# Patient Record
Sex: FEMALE | Race: WHITE | NOT HISPANIC OR LATINO | URBAN - METROPOLITAN AREA
[De-identification: names, ages, dates, MRNs, and addresses within clinical notes are randomized per-mention and may not be internally consistent; named-entity substitution may affect disease eponyms.]

---

## 2017-01-13 ENCOUNTER — IMPORTED ENCOUNTER (OUTPATIENT)
Dept: URBAN - METROPOLITAN AREA CLINIC 43 | Facility: CLINIC | Age: 82
End: 2017-01-13

## 2017-01-13 PROBLEM — H01.002: Noted: 2017-01-13

## 2017-01-13 PROBLEM — H01.005: Noted: 2017-01-13

## 2017-01-13 PROBLEM — H16.223: Noted: 2017-01-13

## 2017-01-13 PROBLEM — H35.363: Noted: 2017-01-13

## 2017-01-13 PROBLEM — H40.013: Noted: 2017-01-13

## 2017-01-30 ENCOUNTER — IMPORTED ENCOUNTER (OUTPATIENT)
Dept: URBAN - METROPOLITAN AREA CLINIC 43 | Facility: CLINIC | Age: 82
End: 2017-01-30

## 2017-08-30 ENCOUNTER — IMPORTED ENCOUNTER (OUTPATIENT)
Dept: URBAN - METROPOLITAN AREA CLINIC 43 | Facility: CLINIC | Age: 82
End: 2017-08-30

## 2017-08-30 PROBLEM — H40.013: Noted: 2017-08-30

## 2017-08-30 PROBLEM — H01.025: Noted: 2017-08-30

## 2017-08-30 PROBLEM — H16.223: Noted: 2017-08-30

## 2017-08-30 PROBLEM — H01.022: Noted: 2017-08-30

## 2017-08-30 PROBLEM — H35.363: Noted: 2017-08-30

## 2017-10-04 ENCOUNTER — IMPORTED ENCOUNTER (OUTPATIENT)
Dept: URBAN - METROPOLITAN AREA CLINIC 43 | Facility: CLINIC | Age: 82
End: 2017-10-04

## 2018-02-14 ENCOUNTER — IMPORTED ENCOUNTER (OUTPATIENT)
Dept: URBAN - METROPOLITAN AREA CLINIC 43 | Facility: CLINIC | Age: 83
End: 2018-02-14

## 2018-02-14 PROBLEM — H16.223: Noted: 2018-02-14

## 2018-02-14 PROBLEM — H40.013: Noted: 2018-02-14

## 2018-08-21 ENCOUNTER — IMPORTED ENCOUNTER (OUTPATIENT)
Dept: URBAN - METROPOLITAN AREA CLINIC 43 | Facility: CLINIC | Age: 83
End: 2018-08-21

## 2018-08-21 PROBLEM — H40.013: Noted: 2018-08-21

## 2018-08-21 PROBLEM — H35.363: Noted: 2018-08-21

## 2018-08-21 PROBLEM — H16.223: Noted: 2018-08-21

## 2018-10-25 ENCOUNTER — IMPORTED ENCOUNTER (OUTPATIENT)
Dept: URBAN - METROPOLITAN AREA CLINIC 43 | Facility: CLINIC | Age: 83
End: 2018-10-25

## 2019-03-07 ENCOUNTER — IMPORTED ENCOUNTER (OUTPATIENT)
Dept: URBAN - METROPOLITAN AREA CLINIC 43 | Facility: CLINIC | Age: 84
End: 2019-03-07

## 2019-03-07 PROBLEM — H16.223: Noted: 2019-03-07

## 2019-03-07 PROBLEM — H02.132: Noted: 2019-03-07

## 2019-03-07 PROBLEM — H40.013: Noted: 2019-03-07

## 2019-03-07 PROBLEM — H02.135: Noted: 2019-03-07

## 2019-09-05 ENCOUNTER — IMPORTED ENCOUNTER (OUTPATIENT)
Dept: URBAN - METROPOLITAN AREA CLINIC 43 | Facility: CLINIC | Age: 84
End: 2019-09-05

## 2019-09-05 ENCOUNTER — PREPPED CHART (OUTPATIENT)
Dept: URBAN - METROPOLITAN AREA CLINIC 32 | Facility: CLINIC | Age: 84
End: 2019-09-05

## 2019-09-05 PROBLEM — H02.132: Noted: 2019-09-05

## 2019-09-05 PROBLEM — H40.013: Noted: 2019-09-05

## 2019-09-05 PROBLEM — H02.135: Noted: 2019-09-05

## 2019-09-05 PROBLEM — H16.223: Noted: 2019-09-05

## 2019-09-05 PROBLEM — H35.363: Noted: 2019-09-05

## 2020-03-12 ASSESSMENT — VISUAL ACUITY
OD_CC: J3
OS_CC: J2+2
OS_CC: 20/30
OU_CC: J1
OU_CC: 20/30
OD_CC: 20/60

## 2020-03-12 ASSESSMENT — TONOMETRY
OD_IOP_MMHG: 18
OS_IOP_MMHG: 16

## 2020-04-19 ASSESSMENT — TONOMETRY
OS_IOP_MMHG: 19.0
OS_IOP_MMHG: 17.0
OD_IOP_MMHG: 19.0
OS_IOP_MMHG: 18.0
OD_IOP_MMHG: 18.0
OD_IOP_MMHG: 18.0
OD_IOP_MMHG: 20.0
OS_IOP_MMHG: 18.0
OS_IOP_MMHG: 16.0
OD_IOP_MMHG: 18.0
OD_IOP_MMHG: 19.0
OS_IOP_MMHG: 18.0

## 2020-04-19 ASSESSMENT — VISUAL ACUITY
OS_CC: J1-2
OS_CC: 20/30
OS_SC: 20/50
OD_CC: 20/50-1
OS_CC: 20/30
OD_CC: J1+-1
OS_SC: 20/60
OS_CC: J2+2
OD_CC: 20/40
OD_CC: J1
OS_CC: J2
OD_SC: 20/60
OS_CC: 20/20
OS_CC: J1+-1
OD_CC: 20/50-1
OS_CC: 20/25-2
OD_PH: 20/50
OD_CC: 20/50
OS_CC: J1
OD_CC: 20/30
OD_CC: 20/25+2
OS_CC: 20/30
OD_SC: 20/60 +2
OD_CC: J3
OD_CC: 20/50
OD_CC: J3
OD_CC: J2
OD_CC: 20/60
OS_CC: 20/30
OS_CC: 20/25+2